# Patient Record
Sex: FEMALE | Race: WHITE | Employment: STUDENT | URBAN - METROPOLITAN AREA
[De-identification: names, ages, dates, MRNs, and addresses within clinical notes are randomized per-mention and may not be internally consistent; named-entity substitution may affect disease eponyms.]

---
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
## 2021-12-27 ENCOUNTER — HOSPITAL ENCOUNTER (EMERGENCY)
Facility: HOSPITAL | Age: 21
Discharge: HOME | End: 2021-12-27
Attending: EMERGENCY MEDICINE
Payer: COMMERCIAL

## 2021-12-27 VITALS
OXYGEN SATURATION: 95 % | WEIGHT: 100 LBS | SYSTOLIC BLOOD PRESSURE: 122 MMHG | HEIGHT: 60 IN | BODY MASS INDEX: 19.63 KG/M2 | RESPIRATION RATE: 16 BRPM | HEART RATE: 103 BPM | DIASTOLIC BLOOD PRESSURE: 103 MMHG | TEMPERATURE: 98 F

## 2021-12-27 DIAGNOSIS — J45.901 MODERATE ASTHMA WITH EXACERBATION, UNSPECIFIED WHETHER PERSISTENT: Primary | ICD-10-CM

## 2021-12-27 PROCEDURE — 94640 AIRWAY INHALATION TREATMENT: CPT

## 2021-12-27 PROCEDURE — 96374 THER/PROPH/DIAG INJ IV PUSH: CPT

## 2021-12-27 PROCEDURE — 99284 EMERGENCY DEPT VISIT MOD MDM: CPT

## 2021-12-27 RX ORDER — METHYLPREDNISOLONE SODIUM SUCCINATE 125 MG/2ML
60 INJECTION, POWDER, LYOPHILIZED, FOR SOLUTION INTRAMUSCULAR; INTRAVENOUS ONCE
Status: COMPLETED | OUTPATIENT
Start: 2021-12-27 | End: 2021-12-27

## 2021-12-27 RX ORDER — ALBUTEROL SULFATE 90 UG/1
2 AEROSOL, METERED RESPIRATORY (INHALATION) EVERY 4 HOURS PRN
Qty: 18 G | Refills: 0 | Status: SHIPPED | OUTPATIENT
Start: 2021-12-27 | End: 2022-01-26

## 2021-12-27 RX ORDER — ALBUTEROL SULFATE 2.5 MG/3ML
2.5 SOLUTION RESPIRATORY (INHALATION) EVERY 4 HOURS PRN
Qty: 30 EACH | Refills: 0 | Status: SHIPPED | OUTPATIENT
Start: 2021-12-27 | End: 2022-01-26

## 2021-12-27 RX ORDER — IPRATROPIUM BROMIDE AND ALBUTEROL SULFATE 2.5; .5 MG/3ML; MG/3ML
3 SOLUTION RESPIRATORY (INHALATION) ONCE
Status: COMPLETED | OUTPATIENT
Start: 2021-12-27 | End: 2021-12-27

## 2021-12-27 RX ORDER — ALBUTEROL SULFATE 2.5 MG/3ML
5 SOLUTION RESPIRATORY (INHALATION) ONCE
Status: COMPLETED | OUTPATIENT
Start: 2021-12-27 | End: 2021-12-27

## 2021-12-27 RX ORDER — PREDNISONE 20 MG/1
60 TABLET ORAL DAILY
Qty: 15 TABLET | Refills: 0 | Status: SHIPPED | OUTPATIENT
Start: 2021-12-27 | End: 2022-01-01

## 2021-12-27 NOTE — ED PROVIDER NOTES
Patient Seen in: [facility] CLINICS Emergency Department      History   Patient presents with:  Difficulty Breathing    Stated Complaint: asthma    Subjective:   HPI    26-year-old female with history of asthma controlled now living in [place] visiting home Pulmonary/Chest: Mild increased respiratory effort but no distress. Audible wheeze bilaterally with auscultation. Abdominal: Soft. There is no tenderness. There is no guarding. Musculoskeletal: Normal range of motion. No edema or tenderness.    Neurol

## 2021-12-28 ENCOUNTER — TELEPHONE (OUTPATIENT)
Dept: INTERNAL MEDICINE CLINIC | Facility: CLINIC | Age: 21
End: 2021-12-28

## (undated) NOTE — MR AVS SNAPSHOT
[place] [address], [address] [address] [phone]               Thank you for choosing us for your health care visit with CRISTINA Lynn.   We are glad to serve you and happy to provide you w Complete antibiotic course. Follow up if fever not improving in next 2-3 days or if symptoms worsening.   If all symptoms seem to be gone and your child is back to normal at the end of treatment, no follow-up is needed (unless we are rechecking due to recu 2                       1  60-71 lbs               12.5 ml                     5                              2&1/2  72-95 lbs               15 ml                        6                              3                       1&1/2             1 This list is accurate as of: 3/7/17  3:06 PM.  Always use your most recent med list.                * albuterol sulfate (2.5 MG/3ML) 0.083% Nebu   inhale 3 milliliter (2.5MG)  by Nebulization route every 4 hours as needed for wheezing or excessive coughing Results of Recent Testing       Educational Information     Healthy Active Living  An initiative of the American Academy of Pediatrics    Fact Sheet: Healthy Active Living for Families    Healthy nutrition starts as early as infancy with breastfeeding.  Onc Visit [facility] online at  [email or web address]

## (undated) NOTE — Clinical Note
[facility] Zie Koko Sanders of Child Health Examination       Student's Name  [place] Public Birth D Title                           Date    (If adding dates to the above immunization history section, put your initials by date(s) and sign here.)   ALTERNATIVE PROOF OF IMMUNITY   1 Diagnosis of asthma? Child wakes during the night coughing   Yes   No    Yes   No    Loss of function of one of paired organs? (eye/ear/kidney/testicle)   Yes   No      Birth Defects? Developmental delay? Yes   No    Yes   No  Hospitalizations? When? Signs of Insulin Resistance (hypertension, dyslipidemia, polycystic ovarian syndrome, acanthosis nigricans)              No             At Risk  No   Lead Risk Questionnaire  Req'd for children 6 months thru 6 yrs enrolled in licensed or public [facility] Needs/Restrictions     None   SPECIAL INSTRUCTIONS/DEVICES e.g. safety glasses, glass eye, chest protector for arrhythmia, pacemaker, prosthetic device, dental bridge, false teeth, athleticsupport/cup     None   MENTAL HEALTH/OTHER   Is there anything else

## (undated) NOTE — ED AVS SNAPSHOT
[facility] Immediate Care in [facility] [address]  [address] [facility]    Phone:  [phone]    Fax:  [address]   MRN: [id number]    Department:  [facility] Immediate Care in [address] [place] [address]   Date of Visit: Discharge Instructions       Patient was advised to take the prescribed antibiotic and to follow up if symptoms persist or worsen. Please continue symptom relief products.     Discharge References/Attachments     PHARYNGITIS, STREP (CONFIRMED) (ANOOP you may call the [place] [address] Physician Referral and Class Registration line at [phone] or find a doctor online by visiting [email or web address].    IF THERE IS ANY CHANGE OR WORSENING OF YOUR CONDITION, CALL YOUR PRIMARY CARE PHYSICIAN AT [place] [address] Additional Information       We are concerned for your overall well being:    - If you are a smoker or have smoked in the last 12 months, we encourage you to explore options for quitting.     - If you have concerns related to behavioral health issues or th

## (undated) NOTE — Clinical Note
ASTHMA ACTION PLAN for Amina Eugene     : 3/27/2000     Date: 2017  Doctor:  Monet Jay MD  Phone for doctor or clinic: [address] [facility], [address]  [phone] Albuterol Sulfate HFA (PROAIR HFA) 108 (90 Base) MCG/ACT Inhalation Aero Soln INHALE 2 PUFFS INTO THE LUNGS EVERY 4 (FOUR) HOURS AS NEEDED FOR WHEEZING    OR    albuterol sulfate (2.5 MG/3ML) 0.083% Inhalation Nebu Soln inhale 3 milliliter (2.5MG)  by [employer]

## (undated) NOTE — LETTER
[address]  [address] [facility]  Dept: [phone]  Dept Fax: [phone]  Loc: [phone]      April 2, 2017    Patient: Dominick Daniel   Date of Visit: 4/2/2017       To Whom It May Concern:    Radha Adkins

## (undated) NOTE — MR AVS SNAPSHOT
[place]  [address]  [phone]               Thank you for choosing us for your health care visit with Madeleine Garcia MD.  We are glad to serve you and happy to provide you with this walls Call if would like Meningitis B vaccine and or Hep A vaccine this Fall      Healthy Active Living  An initiative of the American Academy of Pediatrics    Fact Sheet: Healthy Active Living for Families    Healthy nutrition starts as early as infancy with br Healthy active children are more likely to be healthy active adults!          Allergies as of May 24, 2017     No Known Allergies                Today's Vital Signs     BP Pulse    110/78 mmHg (56 %, Z = 0.14 / 89 %, Z = 1.24*) 84    Height Weight    5' 0.5 take each. Commonly known as:  FOCALIN XR   Start taking on:  6/23/2017           * Dexmethylphenidate HCl ER 10 MG Cp24   Take 1 capsule (10 mg total) by mouth daily. What changed:   You were already taking a medication with the same name, and this pre

## (undated) NOTE — ED AVS SNAPSHOT
Dominick Daniel   MRN: [id number]    Department:  [facility] Emergency Department   Date of Visit:  11/22/2017           Disclosure     Insurance plans vary and the physician(s) referred by the ER may not be covered by your plan.  Please contact y tell this physician (or your personal doctor if your instructions are to return to your personal doctor) about any new or lasting problems. The primary care or specialist physician will see patients referred from the [facility] Emergency Department.  Sandip Angulo

## (undated) NOTE — Clinical Note
3/7/2017              [facility] [address]        [facility] [address]         To whom it may concern,         Please excuse Radha Moralez from school due to illness, she may return when she is 24hrs fever free.  If you have any questions